# Patient Record
Sex: FEMALE | Race: WHITE
[De-identification: names, ages, dates, MRNs, and addresses within clinical notes are randomized per-mention and may not be internally consistent; named-entity substitution may affect disease eponyms.]

---

## 2018-03-15 ENCOUNTER — HOSPITAL ENCOUNTER (OUTPATIENT)
Dept: HOSPITAL 92 - BICMAMMO | Age: 38
Discharge: HOME | End: 2018-03-15
Attending: FAMILY MEDICINE
Payer: MEDICAID

## 2018-03-15 DIAGNOSIS — N64.4: Primary | ICD-10-CM

## 2018-03-15 DIAGNOSIS — N63.10: ICD-10-CM

## 2018-03-15 PROCEDURE — 77066 DX MAMMO INCL CAD BI: CPT

## 2018-03-15 PROCEDURE — G0279 TOMOSYNTHESIS, MAMMO: HCPCS

## 2019-08-02 ENCOUNTER — HOSPITAL ENCOUNTER (OUTPATIENT)
Dept: HOSPITAL 92 - BICMAMMO | Age: 39
Discharge: HOME | End: 2019-08-02
Attending: FAMILY MEDICINE
Payer: MEDICAID

## 2019-08-02 DIAGNOSIS — R92.8: Primary | ICD-10-CM

## 2019-08-02 DIAGNOSIS — Z80.3: ICD-10-CM

## 2019-08-02 PROCEDURE — G0279 TOMOSYNTHESIS, MAMMO: HCPCS

## 2019-08-02 PROCEDURE — 77066 DX MAMMO INCL CAD BI: CPT

## 2019-08-02 NOTE — ULT
LIMITED RIGHT BREAST ULTRASOUND:

 

Date: 8-2-19

 

Provided Clinical History: 

Right breast mass. 

 

FINDINGS: 

Limited sonographic interrogation of the right breast was performed at the 11 o'clock position in the
 region of prior sonographic concern. There is a stable benign area of diminished echogenicity at the
 11 o'clock position of the right breast. No concerning sonographic findings are evident. 

 

IMPRESSION: 

BIRADS category 2 - benign findings. Return to annual screening mammography recommended. 

 

POS: OFF

## 2019-08-02 NOTE — MMO
Bilateral MAMMO Bilat Diag [facility].

 

CLINICAL HISTORY:

Patient is 38 years old and is seen for diagnostic exam. The patient has the

following family history of breast cancer:  cousin female, at age 30.  The

patient has no personal history of cancer.

 

VIEWS:

The views performed were:  bilateral craniocaudal with tomosynthesis; bilateral

mediolateral oblique with tomosynthesis; and bilateral mediolateral with

tomosynthesis.

 

FILMS COMPARED:

The present examination has been compared to prior imaging studies performed at

[facility] on 03/15/2018, 09/17/2018 and 08/02/2019.

 

MAMMOGRAM FINDINGS:

There are scattered fibroglandular densities.

 

There is a stable asymmetry seen in the CC view only seen in the right breast.

 

This appears less conspicuous than on priors.  No concerning sonographic

abnormality.

 

There are no suspicious masses, suspicious calcifications, or new areas of

architectural distortion.

 

IMPRESSION:

THERE IS NO MAMMOGRAPHIC EVIDENCE OF MALIGNANCY.

 

A ROUTINE FOLLOW-UP MAMMOGRAM AT AGE 40 IS RECOMMENDED.

 

THE RESULTS OF THIS EXAM WERE SENT TO THE PATIENT.

 

ACR BI-RADS Category 2 - Benign finding

 

MAMMOGRAPHY NOTE:

 1. A negative mammogram report should not delay a biopsy if a dominant of

 clinically suspicious mass is present.

 2. Approximately 10% to 15% of breast cancers are not detected by

 mammography.

 3. Adenosis and dense breasts may obscure an underlying neoplasm.

 

 

Reported by: YARED LINARES MD    Electonically Signed: [id number]